# Patient Record
Sex: MALE | Race: OTHER | HISPANIC OR LATINO | URBAN - METROPOLITAN AREA
[De-identification: names, ages, dates, MRNs, and addresses within clinical notes are randomized per-mention and may not be internally consistent; named-entity substitution may affect disease eponyms.]

---

## 2018-08-28 ENCOUNTER — INPATIENT (INPATIENT)
Facility: HOSPITAL | Age: 28
LOS: 3 days | Discharge: HOME | End: 2018-09-01
Attending: INTERNAL MEDICINE | Admitting: INTERNAL MEDICINE

## 2018-08-28 VITALS
OXYGEN SATURATION: 95 % | TEMPERATURE: 100 F | HEIGHT: 65 IN | HEART RATE: 130 BPM | DIASTOLIC BLOOD PRESSURE: 101 MMHG | RESPIRATION RATE: 19 BRPM | WEIGHT: 149.91 LBS | SYSTOLIC BLOOD PRESSURE: 157 MMHG

## 2018-08-28 LAB
ALBUMIN SERPL ELPH-MCNC: 5.1 G/DL — SIGNIFICANT CHANGE UP (ref 3.5–5.2)
ALP SERPL-CCNC: 96 U/L — SIGNIFICANT CHANGE UP (ref 30–115)
ALT FLD-CCNC: 37 U/L — SIGNIFICANT CHANGE UP (ref 0–41)
ANION GAP SERPL CALC-SCNC: 19 MMOL/L — HIGH (ref 7–14)
ANION GAP SERPL CALC-SCNC: 24 MMOL/L — HIGH (ref 7–14)
APAP SERPL-MCNC: <5 UG/ML — LOW (ref 10–30)
APPEARANCE UR: CLEAR — SIGNIFICANT CHANGE UP
AST SERPL-CCNC: 63 U/L — HIGH (ref 0–41)
BASE EXCESS BLDV CALC-SCNC: 0.6 MMOL/L — SIGNIFICANT CHANGE UP (ref -2–2)
BASOPHILS # BLD AUTO: 0.04 K/UL — SIGNIFICANT CHANGE UP (ref 0–0.2)
BASOPHILS NFR BLD AUTO: 0.2 % — SIGNIFICANT CHANGE UP (ref 0–1)
BILIRUB SERPL-MCNC: 1.3 MG/DL — HIGH (ref 0.2–1.2)
BILIRUB UR-MCNC: NEGATIVE — SIGNIFICANT CHANGE UP
BUN SERPL-MCNC: 13 MG/DL — SIGNIFICANT CHANGE UP (ref 10–20)
BUN SERPL-MCNC: 9 MG/DL — LOW (ref 10–20)
CALCIUM SERPL-MCNC: 7.5 MG/DL — LOW (ref 8.5–10.1)
CALCIUM SERPL-MCNC: 9.2 MG/DL — SIGNIFICANT CHANGE UP (ref 8.5–10.1)
CHLORIDE SERPL-SCNC: 95 MMOL/L — LOW (ref 98–110)
CHLORIDE SERPL-SCNC: 98 MMOL/L — SIGNIFICANT CHANGE UP (ref 98–110)
CO2 SERPL-SCNC: 20 MMOL/L — SIGNIFICANT CHANGE UP (ref 17–32)
CO2 SERPL-SCNC: 21 MMOL/L — SIGNIFICANT CHANGE UP (ref 17–32)
COLOR SPEC: YELLOW — SIGNIFICANT CHANGE UP
CREAT SERPL-MCNC: 0.7 MG/DL — SIGNIFICANT CHANGE UP (ref 0.7–1.5)
CREAT SERPL-MCNC: 1 MG/DL — SIGNIFICANT CHANGE UP (ref 0.7–1.5)
DIFF PNL FLD: NEGATIVE — SIGNIFICANT CHANGE UP
EOSINOPHIL # BLD AUTO: 0 K/UL — SIGNIFICANT CHANGE UP (ref 0–0.7)
EOSINOPHIL NFR BLD AUTO: 0 % — SIGNIFICANT CHANGE UP (ref 0–8)
ETHANOL SERPL-MCNC: 329 MG/DL — HIGH
GLUCOSE SERPL-MCNC: 134 MG/DL — HIGH (ref 70–99)
GLUCOSE SERPL-MCNC: 168 MG/DL — HIGH (ref 70–99)
GLUCOSE UR QL: NEGATIVE MG/DL — SIGNIFICANT CHANGE UP
HCO3 BLDV-SCNC: 23 MMOL/L — SIGNIFICANT CHANGE UP (ref 22–29)
HCT VFR BLD CALC: 41.7 % — LOW (ref 42–52)
HCT VFR BLD CALC: 49 % — SIGNIFICANT CHANGE UP (ref 42–52)
HGB BLD-MCNC: 15.2 G/DL — SIGNIFICANT CHANGE UP (ref 14–18)
HGB BLD-MCNC: 17.9 G/DL — SIGNIFICANT CHANGE UP (ref 14–18)
HOROWITZ INDEX BLDV+IHG-RTO: 21 — SIGNIFICANT CHANGE UP
IMM GRANULOCYTES NFR BLD AUTO: 0.4 % — HIGH (ref 0.1–0.3)
KETONES UR-MCNC: NEGATIVE — SIGNIFICANT CHANGE UP
LACTATE BLDV-MCNC: 5.6 MMOL/L — HIGH (ref 0.5–1.6)
LACTATE SERPL-SCNC: 3.9 MMOL/L — HIGH (ref 0.5–2.2)
LEUKOCYTE ESTERASE UR-ACNC: NEGATIVE — SIGNIFICANT CHANGE UP
LIDOCAIN IGE QN: 22 U/L — SIGNIFICANT CHANGE UP (ref 7–60)
LYMPHOCYTES # BLD AUTO: 0.85 K/UL — LOW (ref 1.2–3.4)
LYMPHOCYTES # BLD AUTO: 4.1 % — LOW (ref 20.5–51.1)
MCHC RBC-ENTMCNC: 31.8 PG — HIGH (ref 27–31)
MCHC RBC-ENTMCNC: 32.3 PG — HIGH (ref 27–31)
MCHC RBC-ENTMCNC: 36.5 G/DL — SIGNIFICANT CHANGE UP (ref 32–37)
MCHC RBC-ENTMCNC: 36.5 G/DL — SIGNIFICANT CHANGE UP (ref 32–37)
MCV RBC AUTO: 87 FL — SIGNIFICANT CHANGE UP (ref 80–94)
MCV RBC AUTO: 88.5 FL — SIGNIFICANT CHANGE UP (ref 80–94)
MONOCYTES # BLD AUTO: 1.73 K/UL — HIGH (ref 0.1–0.6)
MONOCYTES NFR BLD AUTO: 8.3 % — SIGNIFICANT CHANGE UP (ref 1.7–9.3)
NEUTROPHILS # BLD AUTO: 18.08 K/UL — HIGH (ref 1.4–6.5)
NEUTROPHILS NFR BLD AUTO: 87 % — HIGH (ref 42.2–75.2)
NITRITE UR-MCNC: NEGATIVE — SIGNIFICANT CHANGE UP
NRBC # BLD: 0 /100 WBCS — SIGNIFICANT CHANGE UP (ref 0–0)
NRBC # BLD: 0 /100 WBCS — SIGNIFICANT CHANGE UP (ref 0–0)
PCO2 BLDV: 32 MMHG — LOW (ref 41–51)
PH BLDV: 7.47 — HIGH (ref 7.26–7.43)
PH UR: 6.5 — SIGNIFICANT CHANGE UP (ref 5–8)
PLATELET # BLD AUTO: 328 K/UL — SIGNIFICANT CHANGE UP (ref 130–400)
PLATELET # BLD AUTO: 426 K/UL — HIGH (ref 130–400)
PO2 BLDV: 82 MMHG — HIGH (ref 20–40)
POTASSIUM SERPL-MCNC: 3.5 MMOL/L — SIGNIFICANT CHANGE UP (ref 3.5–5)
POTASSIUM SERPL-MCNC: 4 MMOL/L — SIGNIFICANT CHANGE UP (ref 3.5–5)
POTASSIUM SERPL-SCNC: 3.5 MMOL/L — SIGNIFICANT CHANGE UP (ref 3.5–5)
POTASSIUM SERPL-SCNC: 4 MMOL/L — SIGNIFICANT CHANGE UP (ref 3.5–5)
PROT SERPL-MCNC: 7.5 G/DL — SIGNIFICANT CHANGE UP (ref 6–8)
PROT UR-MCNC: NEGATIVE MG/DL — SIGNIFICANT CHANGE UP
RBC # BLD: 4.71 M/UL — SIGNIFICANT CHANGE UP (ref 4.7–6.1)
RBC # BLD: 5.63 M/UL — SIGNIFICANT CHANGE UP (ref 4.7–6.1)
RBC # FLD: 11.7 % — SIGNIFICANT CHANGE UP (ref 11.5–14.5)
RBC # FLD: 11.9 % — SIGNIFICANT CHANGE UP (ref 11.5–14.5)
SALICYLATES SERPL-MCNC: <0.3 MG/DL — LOW (ref 4–30)
SAO2 % BLDV: 95 % — SIGNIFICANT CHANGE UP
SODIUM SERPL-SCNC: 137 MMOL/L — SIGNIFICANT CHANGE UP (ref 135–146)
SODIUM SERPL-SCNC: 140 MMOL/L — SIGNIFICANT CHANGE UP (ref 135–146)
SP GR SPEC: 1.01 — SIGNIFICANT CHANGE UP (ref 1.01–1.03)
UROBILINOGEN FLD QL: 0.2 MG/DL — SIGNIFICANT CHANGE UP (ref 0.2–0.2)
WBC # BLD: 20.79 K/UL — HIGH (ref 4.8–10.8)
WBC # BLD: 21.09 K/UL — HIGH (ref 4.8–10.8)
WBC # FLD AUTO: 20.79 K/UL — HIGH (ref 4.8–10.8)
WBC # FLD AUTO: 21.09 K/UL — HIGH (ref 4.8–10.8)

## 2018-08-28 RX ORDER — ONDANSETRON 8 MG/1
4 TABLET, FILM COATED ORAL ONCE
Qty: 0 | Refills: 0 | Status: COMPLETED | OUTPATIENT
Start: 2018-08-28 | End: 2018-08-28

## 2018-08-28 RX ORDER — SODIUM CHLORIDE 9 MG/ML
1000 INJECTION INTRAMUSCULAR; INTRAVENOUS; SUBCUTANEOUS ONCE
Qty: 0 | Refills: 0 | Status: COMPLETED | OUTPATIENT
Start: 2018-08-28 | End: 2018-08-28

## 2018-08-28 RX ADMIN — ONDANSETRON 4 MILLIGRAM(S): 8 TABLET, FILM COATED ORAL at 19:41

## 2018-08-28 RX ADMIN — SODIUM CHLORIDE 1000 MILLILITER(S): 9 INJECTION INTRAMUSCULAR; INTRAVENOUS; SUBCUTANEOUS at 23:16

## 2018-08-28 RX ADMIN — SODIUM CHLORIDE 2000 MILLILITER(S): 9 INJECTION INTRAMUSCULAR; INTRAVENOUS; SUBCUTANEOUS at 17:31

## 2018-08-28 RX ADMIN — SODIUM CHLORIDE 2000 MILLILITER(S): 9 INJECTION INTRAMUSCULAR; INTRAVENOUS; SUBCUTANEOUS at 14:37

## 2018-08-28 RX ADMIN — ONDANSETRON 4 MILLIGRAM(S): 8 TABLET, FILM COATED ORAL at 16:50

## 2018-08-28 RX ADMIN — SODIUM CHLORIDE 2000 MILLILITER(S): 9 INJECTION INTRAMUSCULAR; INTRAVENOUS; SUBCUTANEOUS at 17:32

## 2018-08-28 RX ADMIN — Medication 0.5 MILLIGRAM(S): at 15:42

## 2018-08-28 RX ADMIN — SODIUM CHLORIDE 2000 MILLILITER(S): 9 INJECTION INTRAMUSCULAR; INTRAVENOUS; SUBCUTANEOUS at 23:16

## 2018-08-28 NOTE — H&P ADULT - HISTORY OF PRESENT ILLNESS
[age]yo male presented to the ER intoxicated requesting alcohol detox however during evaluation several abnormalities were found. Patient does have nausea but denied fevers, chest pain or shortness of breath 28yo male presented to the ER intoxicated requesting alcohol detox (per ER documents) however during evaluation several abnormalities were found. Patient does have nausea but denied fevers, shortness of breath, chest or abdominal pains

## 2018-08-28 NOTE — ED PROVIDER NOTE - PHYSICAL EXAMINATION
Vital Signs: I have reviewed the initial vital signs.  Constitutional: (+) AOB, NAD,   Eyes: PERRLA, EOMI, no nystagmus, clear conjunctiva  Cardiovascular: regular rate, regular rhythm, no murmur appreciated  Respiratory: unlabored respiratory effort, clear to auscultation bilaterally  Gastrointestinal: soft, non-tender, non-distended  abdomen, no pulsatile mass  Musculoskeletal: supple neck, no lower extremity edema, no bony tenderness  Integumentary: warm, dry, no rash  Neurologic: awake, alert, cranial nerves II-XII grossly intact, extremities’ motor and sensory functions grossly intact, no focal deficits, GCS 15  Psychiatric: appropriate mood, appropriate affect Vital Signs: I have reviewed the initial vital signs.  Constitutional: (+) AOB, NAD,   Eyes: PERRLA, EOMI, clear conjunctiva  Cardiovascular: tachycardic rate, regular rhythm, no murmur appreciated  Respiratory: unlabored respiratory effort, clear to auscultation bilaterally  Gastrointestinal: soft, non-tender, non-distended  abdomen, no pulsatile mass  Musculoskeletal: supple neck, no lower extremity edema, no bony tenderness  Integumentary: warm, dry, no rash  Neurologic: awake, alert, cranial nerves II-XII grossly intact, extremities’ motor and sensory functions grossly intact, no focal deficits, GCS 15  Psychiatric: appropriate mood, appropriate affect

## 2018-08-28 NOTE — H&P ADULT - NSHPLABSRESULTS_GEN_ALL_CORE
15.2    )-----------( 328      ( 28 Aug 2018 20:45 )             41.7         137  |  98  |  9<L>  ----------------------------<  134<H>  4.0   |  20  |  0.7    Ca    7.5<L>      28 Aug 2018 20:45    TPro  7.5  /  Alb  5.1  /  TBili  1.3<H>  /  DBili  x   /  AST  63<H>  /  ALT  37  /  AlkPhos  96            Urinalysis Basic - ( 28 Aug 2018 22:54 )    Color: Yellow / Appearance: Clear / S.015 / pH: x  Gluc: x / Ketone: Negative  / Bili: Negative / Urobili: 0.2 mg/dL   Blood: x / Protein: Negative mg/dL / Nitrite: Negative   Leuk Esterase: Negative / RBC: x / WBC x   Sq Epi: x / Non Sq Epi: x / Bacteria: x        Lactate Trend   @ 20:45 Lactate:3.9         CAPILLARY BLOOD GLUCOSE    < from: CT Abdomen and Pelvis w/ IV Cont (18 @ 23:09) >      EXAM:  CT ABDOMEN AND PELVIS IC            PROCEDURE DATE:  2018          < end of copied text >    < from: CT Abdomen and Pelvis w/ IV Cont (18 @ 23:09) >      IMPRESSION:      No CT evidence of acute intra-abdominal pathology.                  SHELBIE NICOLE M.D., RESIDENT RADIOLOGIST  This document has been electronically signed.  MACK RANGEL M.D., ATTENDING RADIOLOGIST  This document has been electronically signed. Aug 28 2018 11:37PM                < end of copied text > 15.2    )-----------( 328      ( 28 Aug 2018 20:45 )             41.7         137  |  98  |  9<L>  ----------------------------<  134<H>  4.0   |  20  |  0.7    Ca    7.5<L>      28 Aug 2018 20:45    TPro  7.5  /  Alb  5.1  /  TBili  1.3<H>  /  DBili  x   /  AST  63<H>  /  ALT  37  /  AlkPhos  96            Urinalysis Basic - ( 28 Aug 2018 22:54 )    Color: Yellow / Appearance: Clear / S.015 / pH: x  Gluc: x / Ketone: Negative  / Bili: Negative / Urobili: 0.2 mg/dL   Blood: x / Protein: Negative mg/dL / Nitrite: Negative   Leuk Esterase: Negative / RBC: x / WBC x   Sq Epi: x / Non Sq Epi: x / Bacteria: x        Lactate Trend   @ 20:45 Lactate:3.9     ETOH-329      < from: CT Abdomen and Pelvis w/ IV Cont (18 @ 23:09) >    EXAM:  CT ABDOMEN AND PELVIS IC        PROCEDURE DATE:  2018      IMPRESSION:    No CT evidence of acute intra-abdominal pathology.    SHELBIE NICOLE M.D., RESIDENT RADIOLOGIST  This document has been electronically signed.  MACK RANGEL M.D., ATTENDING RADIOLOGIST  This document has been electronically signed. Aug 28 2018 11:37PM     < end of copied text >

## 2018-08-28 NOTE — ED PROVIDER NOTE - PROGRESS NOTE DETAILS
patient able to ambulate without difficulty in the ED. patient speaks Icelandic. RN catherine marie to provide translation at [facility] patient with multiple episodes of diarrhea in the ED . will give IV fluids and repeat labs Pt received IVFs and labs repeated,  Feeling better repeat labs lactate and anion gap appreciated. patient feeling better. awaiting UA and will preform CT of abdomen for continued WBC elevated

## 2018-08-28 NOTE — H&P ADULT - PROBLEM SELECTOR PLAN 1
for now librium, thiamine, folic acid and MVI for now ativan withdrawal protocol, thiamine, folic acid and MVI

## 2018-08-28 NOTE — ED ADULT TRIAGE NOTE - CHIEF COMPLAINT QUOTE
BIBA from home as per EMS "A neighbor called because patient is very intoxicated, he said he drank a lot of beer today".  Pt denies wanting to harmself, pt request detox from alcohol.  used at bedside

## 2018-08-28 NOTE — ED PROVIDER NOTE - CARE PLAN
Principal Discharge DX:	Leukocytosis  Secondary Diagnosis:	Elevated lactic acid level  Secondary Diagnosis:	Alcohol intoxication

## 2018-08-28 NOTE — ED PROVIDER NOTE - ATTENDING CONTRIBUTION TO CARE
26 yo M presents via EMS found intoxicated,  Pt c/o nausea and vomiting.  Had diarrhea in ED, no pain, no fevers.  Admits to drinking beer,  On exam pt in NAD AAo x 3, OP clear, Lungs cAT B/L no wrr, abd isosft nt nd, steady gait

## 2018-08-28 NOTE — ED PROVIDER NOTE - OBJECTIVE STATEMENT
26 y/o M BIBA after being found intoxicated. Pt states he was drinking beer but denies any drug use. Pt denies any SOB, CP, abd pain, fever, chills, v/n/d, SI, HI, and AVH. 28 y/o M BIBA after being found intoxicated. Pt states he was drinking beer but denies any drug use. Pt denies any SOB, CP, abd pain, fever, chills, v/n/d, SI, HI, and AVH. patient requesting alcohol detox

## 2018-08-29 DIAGNOSIS — D72.829 ELEVATED WHITE BLOOD CELL COUNT, UNSPECIFIED: ICD-10-CM

## 2018-08-29 DIAGNOSIS — R79.89 OTHER SPECIFIED ABNORMAL FINDINGS OF BLOOD CHEMISTRY: ICD-10-CM

## 2018-08-29 DIAGNOSIS — F10.929 ALCOHOL USE, UNSPECIFIED WITH INTOXICATION, UNSPECIFIED: ICD-10-CM

## 2018-08-29 LAB
ALBUMIN SERPL ELPH-MCNC: 4.1 G/DL — SIGNIFICANT CHANGE UP (ref 3.5–5.2)
ALP SERPL-CCNC: 83 U/L — SIGNIFICANT CHANGE UP (ref 30–115)
ALT FLD-CCNC: 33 U/L — SIGNIFICANT CHANGE UP (ref 0–41)
ANION GAP SERPL CALC-SCNC: 17 MMOL/L — HIGH (ref 7–14)
AST SERPL-CCNC: 48 U/L — HIGH (ref 0–41)
BILIRUB SERPL-MCNC: 1.8 MG/DL — HIGH (ref 0.2–1.2)
BUN SERPL-MCNC: 7 MG/DL — LOW (ref 10–20)
CALCIUM SERPL-MCNC: 8 MG/DL — LOW (ref 8.5–10.1)
CHLORIDE SERPL-SCNC: 95 MMOL/L — LOW (ref 98–110)
CO2 SERPL-SCNC: 24 MMOL/L — SIGNIFICANT CHANGE UP (ref 17–32)
CREAT SERPL-MCNC: 0.7 MG/DL — SIGNIFICANT CHANGE UP (ref 0.7–1.5)
GLUCOSE SERPL-MCNC: 107 MG/DL — HIGH (ref 70–99)
HCT VFR BLD CALC: 40.7 % — LOW (ref 42–52)
HGB BLD-MCNC: 14.6 G/DL — SIGNIFICANT CHANGE UP (ref 14–18)
LACTATE SERPL-SCNC: 2.9 MMOL/L — HIGH (ref 0.5–2.2)
MCHC RBC-ENTMCNC: 32.1 PG — HIGH (ref 27–31)
MCHC RBC-ENTMCNC: 35.9 G/DL — SIGNIFICANT CHANGE UP (ref 32–37)
MCV RBC AUTO: 89.5 FL — SIGNIFICANT CHANGE UP (ref 80–94)
NRBC # BLD: 0 /100 WBCS — SIGNIFICANT CHANGE UP (ref 0–0)
PLATELET # BLD AUTO: 305 K/UL — SIGNIFICANT CHANGE UP (ref 130–400)
POTASSIUM SERPL-MCNC: 3.5 MMOL/L — SIGNIFICANT CHANGE UP (ref 3.5–5)
POTASSIUM SERPL-SCNC: 3.5 MMOL/L — SIGNIFICANT CHANGE UP (ref 3.5–5)
PROT SERPL-MCNC: 6.2 G/DL — SIGNIFICANT CHANGE UP (ref 6–8)
RBC # BLD: 4.55 M/UL — LOW (ref 4.7–6.1)
RBC # FLD: 11.9 % — SIGNIFICANT CHANGE UP (ref 11.5–14.5)
SODIUM SERPL-SCNC: 136 MMOL/L — SIGNIFICANT CHANGE UP (ref 135–146)
WBC # BLD: 11.61 K/UL — HIGH (ref 4.8–10.8)
WBC # FLD AUTO: 11.61 K/UL — HIGH (ref 4.8–10.8)

## 2018-08-29 RX ORDER — FOLIC ACID 0.8 MG
1 TABLET ORAL DAILY
Qty: 0 | Refills: 0 | Status: DISCONTINUED | OUTPATIENT
Start: 2018-08-28 | End: 2018-09-01

## 2018-08-29 RX ORDER — PANTOPRAZOLE SODIUM 20 MG/1
40 TABLET, DELAYED RELEASE ORAL
Qty: 0 | Refills: 0 | Status: DISCONTINUED | OUTPATIENT
Start: 2018-08-29 | End: 2018-09-01

## 2018-08-29 RX ORDER — THIAMINE MONONITRATE (VIT B1) 100 MG
100 TABLET ORAL DAILY
Qty: 0 | Refills: 0 | Status: DISCONTINUED | OUTPATIENT
Start: 2018-08-28 | End: 2018-09-01

## 2018-08-29 RX ORDER — SODIUM CHLORIDE 9 MG/ML
1000 INJECTION INTRAMUSCULAR; INTRAVENOUS; SUBCUTANEOUS
Qty: 0 | Refills: 0 | Status: DISCONTINUED | OUTPATIENT
Start: 2018-08-28 | End: 2018-08-31

## 2018-08-29 RX ORDER — ONDANSETRON 8 MG/1
4 TABLET, FILM COATED ORAL EVERY 6 HOURS
Qty: 0 | Refills: 0 | Status: DISCONTINUED | OUTPATIENT
Start: 2018-08-29 | End: 2018-09-01

## 2018-08-29 RX ADMIN — Medication 1 MILLIGRAM(S): at 12:14

## 2018-08-29 RX ADMIN — Medication 100 MILLIGRAM(S): at 12:14

## 2018-08-29 RX ADMIN — Medication 1.5 MILLIGRAM(S): at 15:06

## 2018-08-29 RX ADMIN — ONDANSETRON 4 MILLIGRAM(S): 8 TABLET, FILM COATED ORAL at 01:33

## 2018-08-29 RX ADMIN — Medication 1.5 MILLIGRAM(S): at 21:35

## 2018-08-29 RX ADMIN — Medication 1 TABLET(S): at 12:14

## 2018-08-29 RX ADMIN — Medication 1.5 MILLIGRAM(S): at 05:45

## 2018-08-29 RX ADMIN — SODIUM CHLORIDE 1000 MILLILITER(S): 9 INJECTION INTRAMUSCULAR; INTRAVENOUS; SUBCUTANEOUS at 00:05

## 2018-08-29 RX ADMIN — Medication 1.5 MILLIGRAM(S): at 17:14

## 2018-08-29 RX ADMIN — Medication 1.5 MILLIGRAM(S): at 01:33

## 2018-08-29 RX ADMIN — Medication 1.5 MILLIGRAM(S): at 10:30

## 2018-08-29 NOTE — CONSULT NOTE ADULT - ASSESSMENT
alcohol dependence intoxication    medical stabilization  librium protocol   transfer to detox upon medical stabilization. alcohol dependence intoxication    [facility]   [facility] upon [facility].

## 2018-08-29 NOTE — CONSULT NOTE ADULT - SUBJECTIVE AND OBJECTIVE BOX
Reason for Admission: alcohol induced abnormalities	  History of Present Illness: 	  28yo male presented to the ER intoxicated requesting alcohol detox (per ER documents) however during evaluation several abnormalities were found. Patient does have nausea but denied fevers, shortness of breath, chest or abdominal pains    pt reports drinking 12 pack of mexican beer every day and came to hospital to get help to stop drinking. wants detox program. denies any other drug use. no psychiatric problems.    alert ox 3 mood stable no psychosis no threat to self or others. i/j limited.

## 2018-08-29 NOTE — PROGRESS NOTE ADULT - ASSESSMENT
26yo male presented to the ER intoxicated requesting alcohol detox (per ER documents) however during evaluation several abnormalities were found. Patient does have nausea but denied fevers, shortness of breath, chest or abdominal pains    pt reports drinking 12 pack of mexican beer every day and came to hospital to get help to stop drinking. wants detox program. denies any other drug use. no psychiatric problems.    alert ox 3 mood stable no psychosis no threat to self or others. i/j limited.    alcohol dependence intoxication    medical stabilization  ativan  protocol   transfer to detox upon medical stabilization. Patient is a 28yo male with h/o Alcohol dependence, presented to the ER intoxicated requesting alcohol detox (per ER documents) however during evaluation he was noted to have elevated lactate level & leukocytosis of 20k. Patient had no other complaints except persistent nausea without vomiting and epigastric pain. He mentions drinking ~ 12 cans of Mexican beer every day with last drink being the morning of presentation. He denies any other drug use and has no Psychiatric problems.      Assessment and Plan:   Problem/Plan - 1:  ·  Problem: Alcoholic intoxication with complication.    Plan: Continue Ativan withdrawal protocol,. On thiamine, folic acid and MVI.   Transfer to detox upon medical stabilization.      Problem/Plan - 2:  ·  Problem: Leukocytosis, unspecified type.    Plan: No obvious source of Infection. Levels trending down.  Continue monitoring. .       Problem/Plan - 3:  ·  Problem: Elevated lactic acid level.    Plan: Continue IV fluids. Levels trending down.       DVT prophylaxis: Heparin.  Disposition: Detox when stable.

## 2018-08-29 NOTE — PROGRESS NOTE ADULT - SUBJECTIVE AND OBJECTIVE BOX
MARIA INES CHILDERSO  27y  Male    Patient is a 27y old  Male who presents with a chief complaint of alcohol induced abnormalities (28 Aug 2018 23:42)      INTERVAL HPI/OVERNIGHT EVENTS:      REVIEW OF SYSTEMS:  CONSTITUTIONAL: No fever, weight loss, or fatigue  EYES: No eye pain, visual disturbances, or discharge  ENMT:  No difficulty hearing, tinnitus, vertigo; No sinus or throat pain  NECK: No pain or stiffness  BREASTS: No pain, masses, or nipple discharge  RESPIRATORY: No cough, wheezing, chills or hemoptysis; No shortness of breath  CARDIOVASCULAR: No chest pain, palpitations, dizziness, or leg swelling  GASTROINTESTINAL: No abdominal or epigastric pain. No nausea, vomiting, or hematemesis; No diarrhea or constipation. No melena or hematochezia.  GENITOURINARY: No dysuria, frequency, hematuria, or incontinence  NEUROLOGICAL: No headaches, memory loss, loss of strength, numbness, or tremors  SKIN: No itching, burning, rashes, or lesions   LYMPH NODES: No enlarged glands  ENDOCRINE: No heat or cold intolerance; No hair loss  MUSCULOSKELETAL: No joint pain or swelling; No muscle, back, or extremity pain  PSYCHIATRIC: No depression, anxiety, mood swings, or difficulty sleeping  HEME/LYMPH: No easy bruising, or bleeding gums  ALLERY AND IMMUNOLOGIC: No hives or eczema    VITALS:  T(F): 97.4 (08-29-18 @ 00:04), Max: 100.1 (08-28-18 @ 13:52)  HR: 73 (08-29-18 @ 00:04) (73 - 130)  BP: 136/91 (08-29-18 @ 00:04) (136/73 - 157/101)  RR: 18 (08-29-18 @ 00:04) (18 - 19)  SpO2: 98% (08-29-18 @ 00:04) (95% - 99%)    PHYSICAL EXAM:  GENERAL: NAD, well-groomed, well-developed  HEAD:  Atraumatic, Normocephalic  EYES: EOMI, PERRLA, conjunctiva and sclera clear  ENMT: No tonsillar erythema, exudates, or enlargement; Moist mucous membranes, Good dentition, No lesions  NECK: Supple, No JVD, Normal thyroid  NERVOUS SYSTEM:  Alert & Oriented X3, Good concentration; Motor Strength 5/5 B/L upper and lower extremities; DTRs 2+ intact and symmetric  CHEST/LUNG: Clear to percussion bilaterally; No rales, rhonchi, wheezing, or rubs  HEART: Regular rate and rhythm; No murmurs, rubs, or gallops  ABDOMEN: Soft, Nontender, Nondistended; Bowel sounds present  EXTREMITIES:  2+ Peripheral Pulses, No clubbing, cyanosis, or edema  LYMPH: No lymphadenopathy noted  SKIN: No rashes or lesions    Consultant(s) Notes Reviewed:  [x ] YES  [ ] NO  Care Discussed with Consultants/Other Providers [ x] YES  [ ] NO    LABS:                        14.6   11.61 )-----------( 305      ( 29 Aug 2018 06:29 )             40.7     08-29    136  |  95<L>  |  7<L>  ----------------------------<  107<H>  3.5   |  24  |  0.7    Ca    8.0<L>      29 Aug 2018 06:29    TPro  6.2  /  Alb  4.1  /  TBili  1.8<H>  /  DBili  x   /  AST  48<H>  /  ALT  33  /  AlkPhos  83  08-29    MICROBIOLOGY:      RADIOLOGY & ADDITIONAL TESTS:    Imaging Personally Reviewed:  [ ] YES  [ ] NO  MEDICATION:  folic acid 1 milliGRAM(s) Oral daily  LORazepam     Tablet   Oral   LORazepam     Tablet 1.5 milliGRAM(s) Oral every 4 hours  multivitamin 1 Tablet(s) Oral daily  ondansetron Injectable 4 milliGRAM(s) IV Push every 6 hours PRN  sodium chloride 0.9%. 1000 milliLiter(s) IV Continuous <Continuous>  thiamine 100 milliGRAM(s) Oral daily      HEALTH ISSUES:  HEALTH ISSUES - PROBLEM Dx:  Elevated lactic acid level: Elevated lactic acid level  Leukocytosis, unspecified type: Leukocytosis, unspecified type  Alcoholic intoxication with complication: Alcoholic intoxication with complication MARIA INES CHILDERSO  27y  Male    Patient is a 27y old  Male who presents with a chief complaint of alcohol Intoxication (28 Aug 2018 23:42)      INTERVAL HPI/OVERNIGHT EVENTS:  No interval events. Patient complaining of Insomnia, epigastric pain and nausea.      REVIEW OF SYSTEMS:  CONSTITUTIONAL: No fever, weight loss, or fatigue  EYES: No eye pain, visual disturbances, or discharge  ENMT:  No difficulty hearing, tinnitus, vertigo; No sinus or throat pain  NECK: No pain or stiffness  BREASTS: No pain, masses, or nipple discharge  RESPIRATORY: No cough, wheezing, chills or hemoptysis; No shortness of breath  CARDIOVASCULAR: No chest pain, palpitations, dizziness, or leg swelling  GASTROINTESTINAL: + epigastric pain. + nausea, No vomiting, or hematemesis; No diarrhea or constipation. No melena or hematochezia.  GENITOURINARY: No dysuria, frequency, hematuria, or incontinence  NEUROLOGICAL: No headaches, memory loss, loss of strength, numbness, or tremors  SKIN: No itching, burning, rashes, or lesions   LYMPH NODES: No enlarged glands  ENDOCRINE: No heat or cold intolerance; No hair loss  MUSCULOSKELETAL: No joint pain or swelling; No muscle, back, or extremity pain  PSYCHIATRIC: No depression, anxiety, mood swings, + difficulty sleeping  HEME/LYMPH: No easy bruising, or bleeding gums  ALLERGY AND IMMUNOLOGIC: No hives or eczema      VITALS:  T(F): 97.4 (18 @ 00:04), Max: 100.1 (18 @ 13:52)  HR: 73 (18 @ 00:04) (73 - 130)  BP: 136/91 (18 @ 00:04) (136/73 - 157/101)  RR: 18 (18 @ 00:04) (18 - 19)  SpO2: 98% (18 @ 00:04) (95% - 99%)      PHYSICAL EXAM:  GENERAL: NAD  HEAD:  Atraumatic, Normocephalic  EYES: conjunctiva and sclera clear  ENMT: Moist mucous membranes, Good dentition, No lesions  NECK: Supple, Normal thyroid  NERVOUS SYSTEM:  Alert & Oriented X 3, Good concentration; Motor Strength 5/5 B/L upper and lower extremities  CHEST/LUNG: Clear to auscultation bilaterally; No rales, rhonchi, wheezing, or rubs  HEART: Regular rate and rhythm; No murmurs, rubs, or gallops  ABDOMEN: Soft, Nontender, Nondistended; Bowel sounds present  EXTREMITIES:  2+ Peripheral Pulses, No clubbing, cyanosis, or edema  LYMPH: No lymphadenopathy noted  SKIN: No rashes or lesions    Consultant(s) Notes Reviewed:  [x ] YES  [ ] NO  Care Discussed with Consultants/Other Providers [ x] YES  [ ] NO    LABS:                        14.6   11.61 )-----------( 305      ( 29 Aug 2018 06:29 )             40.7         136  |  95<L>  |  7<L>  ----------------------------<  107<H>  3.5   |  24  |  0.7    Ca    8.0<L>      29 Aug 2018 06:29    TPro  6.2  /  Alb  4.1  /  TBili  1.8<H>  /  DBili  x   /  AST  48<H>  /  ALT  33  /  AlkPhos  83      Acetaminophen Level, Serum: <5.0 ug/mL (18 @ 14:20)  Salicylate Level, Serum: <0.3 mg/dL (18 @ 14:20)  Alcohol, Blood: 329 mg/dL (18 @ 14:20)      Blood Gas Venous - Lactate: 5.6 mmoL/L (18 @ 14:20)  Lactate, Blood: 3.9 mmol/L (18 @ 20:45)  Lactate, Blood: 2.9 mmol/L (18 @ 06:56)      Urinalysis Basic - ( 28 Aug 2018 22:54 )    Color: Yellow / Appearance: Clear / S.015 / pH: x  Gluc: x / Ketone: Negative  / Bili: Negative / Urobili: 0.2 mg/dL   Blood: x / Protein: Negative mg/dL / Nitrite: Negative   Leuk Esterase: Negative / RBC: x / WBC x   Sq Epi: x / Non Sq Epi: x / Bacteria: x      MICROBIOLOGY: None      RADIOLOGY & ADDITIONAL TESTS:  X-ray Chest 2 Views PA/Lat (18 @ 17:39)   Support devices: None.    Cardiac/mediastinum/hilum: Unremarkable.    Lung parenchyma/Pleura: Within normal limits. Hyperinflation.    Skeleton/soft tissues: Levoscoliosis.    Impression:      No radiographic evidence of acute cardiopulmonary disease.      CT Abdomen and Pelvis w/ IV Cont (18 @ 23:09)   LOWER CHEST: Unremarkable.    HEPATOBILIARY: The liver is normal in appearance.  No evidence of intra   or extrahepatic biliary dilatation. The gallbladder is suboptimally   imaged.    SPLEEN: Unremarkable.    PANCREAS: Unremarkable.    ADRENAL GLANDS: Unremarkable.    KIDNEYS: Symmetric pattern of renal enhancement. No evidence of a mass,   hydronephrosis or hydroureter.    ABDOMINOPELVIC NODES: No enlarged abdominal or pelvic lymph nodes.    PELVIC ORGANS: Unremarkable.    PERITONEUM/MESENTERY/BOWEL: No bowel obstruction, ascites or free   intraperitoneal air. The appendix is unremarkable.    BONES/SOFT TISSUES: There is a well-corticated defect involving the   posterior aspect of the superior L4 endplate with small ossicle noted   slightly more posteriorly. 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 Superimposed disc herniation is noted.    VASCULAR:  The aorta is normal in caliber.      IMPRESSION:      No CT evidence of acute intra-abdominal pathology.      Imaging Personally Reviewed:  [x] YES  [ ] NO    MEDICATIONS  (STANDING):  folic acid 1 milliGRAM(s) Oral daily  LORazepam     Tablet   Oral   LORazepam     Tablet 1.5 milliGRAM(s) Oral every 4 hours  multivitamin 1 Tablet(s) Oral daily  sodium chloride 0.9%. 1000 milliLiter(s) (100 mL/Hr) IV Continuous <Continuous>  thiamine 100 milliGRAM(s) Oral daily    MEDICATIONS  (PRN):  ondansetron Injectable 4 milliGRAM(s) IV Push every 6 hours PRN Nausea and/or Vomiting        HEALTH ISSUES - PROBLEM Dx:  Elevated lactic acid level  Leukocytosis, unspecified type  Alcoholic intoxication with complication

## 2018-08-29 NOTE — PROGRESS NOTE ADULT - SUBJECTIVE AND OBJECTIVE BOX
Patient tells me he is feeling better and wants to have a regular diet (He also mentions that he is not interested in staying for a detox admission)

## 2018-08-30 LAB
ALBUMIN SERPL ELPH-MCNC: 4.1 G/DL — SIGNIFICANT CHANGE UP (ref 3.5–5.2)
ALP SERPL-CCNC: 117 U/L — HIGH (ref 30–115)
ALT FLD-CCNC: 35 U/L — SIGNIFICANT CHANGE UP (ref 0–41)
ANION GAP SERPL CALC-SCNC: 15 MMOL/L — HIGH (ref 7–14)
AST SERPL-CCNC: 42 U/L — HIGH (ref 0–41)
BASOPHILS # BLD AUTO: 0.04 K/UL — SIGNIFICANT CHANGE UP (ref 0–0.2)
BASOPHILS NFR BLD AUTO: 0.4 % — SIGNIFICANT CHANGE UP (ref 0–1)
BILIRUB SERPL-MCNC: 1.9 MG/DL — HIGH (ref 0.2–1.2)
BUN SERPL-MCNC: 6 MG/DL — LOW (ref 10–20)
CALCIUM SERPL-MCNC: 8.8 MG/DL — SIGNIFICANT CHANGE UP (ref 8.5–10.1)
CHLORIDE SERPL-SCNC: 94 MMOL/L — LOW (ref 98–110)
CO2 SERPL-SCNC: 25 MMOL/L — SIGNIFICANT CHANGE UP (ref 17–32)
CREAT SERPL-MCNC: 0.7 MG/DL — SIGNIFICANT CHANGE UP (ref 0.7–1.5)
EOSINOPHIL # BLD AUTO: 0.05 K/UL — SIGNIFICANT CHANGE UP (ref 0–0.7)
EOSINOPHIL NFR BLD AUTO: 0.4 % — SIGNIFICANT CHANGE UP (ref 0–8)
GLUCOSE SERPL-MCNC: 111 MG/DL — HIGH (ref 70–99)
HCT VFR BLD CALC: 43.6 % — SIGNIFICANT CHANGE UP (ref 42–52)
HGB BLD-MCNC: 15.4 G/DL — SIGNIFICANT CHANGE UP (ref 14–18)
IMM GRANULOCYTES NFR BLD AUTO: 0.5 % — HIGH (ref 0.1–0.3)
LACTATE SERPL-SCNC: 1.2 MMOL/L — SIGNIFICANT CHANGE UP (ref 0.5–2.2)
LYMPHOCYTES # BLD AUTO: 1.45 K/UL — SIGNIFICANT CHANGE UP (ref 1.2–3.4)
LYMPHOCYTES # BLD AUTO: 12.8 % — LOW (ref 20.5–51.1)
MAGNESIUM SERPL-MCNC: 2.1 MG/DL — SIGNIFICANT CHANGE UP (ref 1.8–2.4)
MCHC RBC-ENTMCNC: 31.8 PG — HIGH (ref 27–31)
MCHC RBC-ENTMCNC: 35.3 G/DL — SIGNIFICANT CHANGE UP (ref 32–37)
MCV RBC AUTO: 90.1 FL — SIGNIFICANT CHANGE UP (ref 80–94)
MONOCYTES # BLD AUTO: 0.92 K/UL — HIGH (ref 0.1–0.6)
MONOCYTES NFR BLD AUTO: 8.1 % — SIGNIFICANT CHANGE UP (ref 1.7–9.3)
NEUTROPHILS # BLD AUTO: 8.81 K/UL — HIGH (ref 1.4–6.5)
NEUTROPHILS NFR BLD AUTO: 77.8 % — HIGH (ref 42.2–75.2)
NRBC # BLD: 0 /100 WBCS — SIGNIFICANT CHANGE UP (ref 0–0)
PHOSPHATE SERPL-MCNC: 3.5 MG/DL — SIGNIFICANT CHANGE UP (ref 2.1–4.9)
PLATELET # BLD AUTO: 283 K/UL — SIGNIFICANT CHANGE UP (ref 130–400)
POTASSIUM SERPL-MCNC: 3.3 MMOL/L — LOW (ref 3.5–5)
POTASSIUM SERPL-SCNC: 3.3 MMOL/L — LOW (ref 3.5–5)
PROT SERPL-MCNC: 6.7 G/DL — SIGNIFICANT CHANGE UP (ref 6–8)
RBC # BLD: 4.84 M/UL — SIGNIFICANT CHANGE UP (ref 4.7–6.1)
RBC # FLD: 11.6 % — SIGNIFICANT CHANGE UP (ref 11.5–14.5)
SODIUM SERPL-SCNC: 134 MMOL/L — LOW (ref 135–146)
WBC # BLD: 11.33 K/UL — HIGH (ref 4.8–10.8)
WBC # FLD AUTO: 11.33 K/UL — HIGH (ref 4.8–10.8)

## 2018-08-30 RX ORDER — IBUPROFEN 200 MG
600 TABLET ORAL ONCE
Qty: 0 | Refills: 0 | Status: COMPLETED | OUTPATIENT
Start: 2018-08-30 | End: 2018-08-30

## 2018-08-30 RX ORDER — SODIUM CHLORIDE 9 MG/ML
1000 INJECTION INTRAMUSCULAR; INTRAVENOUS; SUBCUTANEOUS
Qty: 0 | Refills: 0 | Status: DISCONTINUED | OUTPATIENT
Start: 2018-08-30 | End: 2018-09-01

## 2018-08-30 RX ORDER — POTASSIUM CHLORIDE 20 MEQ
40 PACKET (EA) ORAL EVERY 4 HOURS
Qty: 0 | Refills: 0 | Status: COMPLETED | OUTPATIENT
Start: 2018-08-30 | End: 2018-08-30

## 2018-08-30 RX ORDER — ACETAMINOPHEN 500 MG
650 TABLET ORAL EVERY 6 HOURS
Qty: 0 | Refills: 0 | Status: DISCONTINUED | OUTPATIENT
Start: 2018-08-30 | End: 2018-09-01

## 2018-08-30 RX ADMIN — Medication 1 MILLIGRAM(S): at 17:31

## 2018-08-30 RX ADMIN — Medication 1 MILLIGRAM(S): at 13:22

## 2018-08-30 RX ADMIN — Medication 1 MILLIGRAM(S): at 10:00

## 2018-08-30 RX ADMIN — Medication 1 MILLIGRAM(S): at 21:33

## 2018-08-30 RX ADMIN — Medication 40 MILLIEQUIVALENT(S): at 13:24

## 2018-08-30 RX ADMIN — Medication 1 MILLIGRAM(S): at 11:29

## 2018-08-30 RX ADMIN — Medication 1 MILLIGRAM(S): at 02:08

## 2018-08-30 RX ADMIN — Medication 100 MILLIGRAM(S): at 11:29

## 2018-08-30 RX ADMIN — Medication 600 MILLIGRAM(S): at 13:22

## 2018-08-30 RX ADMIN — Medication 40 MILLIEQUIVALENT(S): at 17:31

## 2018-08-30 RX ADMIN — PANTOPRAZOLE SODIUM 40 MILLIGRAM(S): 20 TABLET, DELAYED RELEASE ORAL at 06:00

## 2018-08-30 RX ADMIN — Medication 1 TABLET(S): at 11:29

## 2018-08-30 RX ADMIN — SODIUM CHLORIDE 125 MILLILITER(S): 9 INJECTION INTRAMUSCULAR; INTRAVENOUS; SUBCUTANEOUS at 10:01

## 2018-08-30 RX ADMIN — Medication 1 MILLIGRAM(S): at 06:00

## 2018-08-30 RX ADMIN — Medication 600 MILLIGRAM(S): at 13:55

## 2018-08-30 NOTE — PROGRESS NOTE ADULT - SUBJECTIVE AND OBJECTIVE BOX
MODE CHILDERS  27y  Male    Patient is a 27y old  Male who presents with a chief complaint of alcohol Intoxication (28 Aug 2018 23:42)      INTERVAL HPI/OVERNIGHT EVENTS:  No interval events. Patient complaining headache.  Insomnia, epigastric pain and nausea resolved.      REVIEW OF SYSTEMS:  CONSTITUTIONAL: No fever, weight loss, or fatigue  EYES: No eye pain, visual disturbances, or discharge  ENMT:  No difficulty hearing, tinnitus, vertigo; No sinus or throat pain  NECK: No pain or stiffness  BREASTS: No pain, masses, or nipple discharge  RESPIRATORY: No cough, wheezing, chills or hemoptysis; No shortness of breath  CARDIOVASCULAR: No chest pain, palpitations, dizziness, or leg swelling  GASTROINTESTINAL: No epigastric pain. No nausea, No vomiting, or hematemesis; No diarrhea or constipation. No melena or hematochezia.  GENITOURINARY: No dysuria, frequency, hematuria, or incontinence  NEUROLOGICAL: No headaches, memory loss, loss of strength, numbness, or tremors  SKIN: No itching, burning, rashes, or lesions   LYMPH NODES: No enlarged glands  ENDOCRINE: No heat or cold intolerance; No hair loss  MUSCULOSKELETAL: No joint pain or swelling; No muscle, back, or extremity pain  PSYCHIATRIC: No depression, anxiety, mood swings, + difficulty sleeping  HEME/LYMPH: No easy bruising, or bleeding gums  ALLERGY AND IMMUNOLOGIC: No hives or eczema      VITALS:  T(C): 36.8 (30 Aug 2018 05:21), Max: 37.1 (29 Aug 2018 14:00)  T(F): 98.2 (30 Aug 2018 05:21), Max: 98.7 (29 Aug 2018 14:00)  HR: 96 (30 Aug 2018 09:40) (64 - 96)  BP: 146/94 (30 Aug 2018 09:40) (133/76 - 162/94)  BP(mean): --  RR: 18 (30 Aug 2018 09:38) (18 - 18)  SpO2: --      PHYSICAL EXAM:  GENERAL: NAD  HEAD:  Atraumatic, Normocephalic  EYES: conjunctiva and sclera clear  ENMT: Moist mucous membranes, Good dentition, No lesions  NECK: Supple, Normal thyroid  NERVOUS SYSTEM:  Alert & Oriented X 3, Good concentration; Motor Strength 5/5 B/L upper and lower extremities  CHEST/LUNG: Clear to auscultation bilaterally; No rales, rhonchi, wheezing, or rubs  HEART: Regular rate and rhythm; No murmurs, rubs, or gallops  ABDOMEN: Soft, Nontender, Nondistended; Bowel sounds present  EXTREMITIES:  2+ Peripheral Pulses, No clubbing, cyanosis, or edema  LYMPH: No lymphadenopathy noted  SKIN: No rashes or lesions    Consultant(s) Notes Reviewed:  [x ] YES  [ ] NO  Care Discussed with Consultants/Other Providers [ x] YES  [ ] NO    LABS:                                   15.4   11.33 )-----------( 283      ( 30 Aug 2018 08:57 )             43.6         134<L>  |  94<L>  |  6<L>  ----------------------------<  111<H>  3.3<L>   |  25  |  0.7    Ca    8.8      30 Aug 2018 08:57  Phos  3.5       Mg     2.1         TPro  6.7  /  Alb  4.1  /  TBili  1.9<H>  /  DBili  x   /  AST  42<H>  /  ALT  35  /  AlkPhos  117<H>        Acetaminophen Level, Serum: <5.0 ug/mL (18 @ 14:20)  Salicylate Level, Serum: <0.3 mg/dL (18 @ 14:20)  Alcohol, Blood: 329 mg/dL (18 @ 14:20)      Blood Gas Venous - Lactate: 5.6 mmoL/L (18 @ 14:20)  Lactate, Blood: 3.9 mmol/L (18 @ 20:45)  Lactate, Blood: 2.9 mmol/L (18 @ 06:56)  Lactate, Blood: 1.2 mmol/L (18 @ 08:57)    Urinalysis Basic - ( 28 Aug 2018 22:54 )    Color: Yellow / Appearance: Clear / S.015 / pH: x  Gluc: x / Ketone: Negative  / Bili: Negative / Urobili: 0.2 mg/dL   Blood: x / Protein: Negative mg/dL / Nitrite: Negative   Leuk Esterase: Negative / RBC: x / WBC x   Sq Epi: x / Non Sq Epi: x / Bacteria: x      MICROBIOLOGY: None      RADIOLOGY & ADDITIONAL TESTS:  X-ray Chest 2 Views PA/Lat (18 @ 17:39)   Support devices: None.    Cardiac/mediastinum/hilum: Unremarkable.    Lung parenchyma/Pleura: Within normal limits. Hyperinflation.    Skeleton/soft tissues: Levoscoliosis.    Impression:      No radiographic evidence of acute cardiopulmonary disease.      CT Abdomen and Pelvis w/ IV Cont (18 @ 23:09)   LOWER CHEST: Unremarkable.    HEPATOBILIARY: The liver is normal in appearance.  No evidence of intra   or extrahepatic biliary dilatation. The gallbladder is suboptimally   imaged.    SPLEEN: Unremarkable.    PANCREAS: Unremarkable.    ADRENAL GLANDS: Unremarkable.    KIDNEYS: Symmetric pattern of renal enhancement. No evidence of a mass,   hydronephrosis or hydroureter.    ABDOMINOPELVIC NODES: No enlarged abdominal or pelvic lymph nodes.    PELVIC ORGANS: Unremarkable.    PERITONEUM/MESENTERY/BOWEL: No bowel obstruction, ascites or free   intraperitoneal air. The appendix is unremarkable.    BONES/SOFT TISSUES: There is a well-corticated defect involving the   posterior aspect of the superior L4 endplate with small ossicle noted   slightly more posteriorly. 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 Superimposed disc herniation is noted.    VASCULAR:  The aorta is normal in caliber.      IMPRESSION:      No CT evidence of acute intra-abdominal pathology.      Imaging Personally Reviewed:  [x] YES  [ ] NO    MEDICATIONS  (STANDING):  folic acid 1 milliGRAM(s) Oral daily  ibuprofen  Tablet 600 milliGRAM(s) Oral once  LORazepam     Tablet   Oral   LORazepam     Tablet 1 milliGRAM(s) Oral every 4 hours  multivitamin 1 Tablet(s) Oral daily  pantoprazole    Tablet 40 milliGRAM(s) Oral before breakfast  potassium chloride    Tablet ER 40 milliEquivalent(s) Oral every 4 hours  sodium chloride 0.9%. 1000 milliLiter(s) (125 mL/Hr) IV Continuous <Continuous>  sodium chloride 0.9%. 1000 milliLiter(s) (100 mL/Hr) IV Continuous <Continuous>  thiamine 100 milliGRAM(s) Oral daily    MEDICATIONS  (PRN):  acetaminophen   Tablet. 650 milliGRAM(s) Oral every 6 hours PRN headache  ondansetron Injectable 4 milliGRAM(s) IV Push every 6 hours PRN Nausea and/or Vomiting          HEALTH ISSUES - PROBLEM Dx:  Elevated lactic acid level  Leukocytosis, unspecified type  Alcoholic intoxication with complication

## 2018-08-30 NOTE — PROGRESS NOTE ADULT - ASSESSMENT
Patient is a 28yo male with h/o Alcohol dependence, presented to the ER intoxicated requesting alcohol detox (per ER documents) however during evaluation he was noted to have elevated lactate level & leukocytosis of 20k. Patient had no other complaints except persistent nausea without vomiting and epigastric pain. He mentions drinking ~ 12 cans of Mexican beer every day with last drink being the morning of presentation. He denies any other drug use and has no Psychiatric problems.      Assessment and Plan:   Problem/Plan - 1:  ·  Problem: Alcoholic intoxication with complication.    Plan: Continue Ativan withdrawal protocol,. On thiamine, folic acid and MVI.   Transfer to detox upon medical stabilization.      Problem/Plan - 2:  ·  Problem: Leukocytosis, unspecified type.    Plan: No obvious source of Infection. Levels trended down.  Continue monitoring.       Problem/Plan - 3:  ·  Problem: Elevated lactic acid level.    Plan: Continue IV fluids. Levels trended down.       Problem/Plan - 4:  ·  Problem: Elevated Liver enzymes.   Plan: Mildly elevated Enzymes. CT scan negative any hepatobiliary abnormality.  Follow up Ultrasound. Trend liver enzymes.      DVT prophylaxis: Heparin.  Disposition: Detox when stable.

## 2018-08-31 LAB
ALBUMIN SERPL ELPH-MCNC: 3.7 G/DL — SIGNIFICANT CHANGE UP (ref 3.5–5.2)
ALP SERPL-CCNC: 93 U/L — SIGNIFICANT CHANGE UP (ref 30–115)
ALT FLD-CCNC: 31 U/L — SIGNIFICANT CHANGE UP (ref 0–41)
ANION GAP SERPL CALC-SCNC: 12 MMOL/L — SIGNIFICANT CHANGE UP (ref 7–14)
AST SERPL-CCNC: 33 U/L — SIGNIFICANT CHANGE UP (ref 0–41)
BILIRUB SERPL-MCNC: 0.7 MG/DL — SIGNIFICANT CHANGE UP (ref 0.2–1.2)
BUN SERPL-MCNC: 6 MG/DL — LOW (ref 10–20)
CALCIUM SERPL-MCNC: 8.3 MG/DL — LOW (ref 8.5–10.1)
CHLORIDE SERPL-SCNC: 100 MMOL/L — SIGNIFICANT CHANGE UP (ref 98–110)
CO2 SERPL-SCNC: 23 MMOL/L — SIGNIFICANT CHANGE UP (ref 17–32)
CREAT SERPL-MCNC: 0.6 MG/DL — LOW (ref 0.7–1.5)
GLUCOSE SERPL-MCNC: 106 MG/DL — HIGH (ref 70–99)
HAV IGM SER-ACNC: SIGNIFICANT CHANGE UP
HBV CORE IGM SER-ACNC: SIGNIFICANT CHANGE UP
HBV SURFACE AG SER-ACNC: SIGNIFICANT CHANGE UP
HCT VFR BLD CALC: 39.9 % — LOW (ref 42–52)
HCV AB S/CO SERPL IA: 0.09 S/CO — SIGNIFICANT CHANGE UP
HCV AB SERPL-IMP: SIGNIFICANT CHANGE UP
HGB BLD-MCNC: 14.3 G/DL — SIGNIFICANT CHANGE UP (ref 14–18)
MCHC RBC-ENTMCNC: 32.4 PG — HIGH (ref 27–31)
MCHC RBC-ENTMCNC: 35.8 G/DL — SIGNIFICANT CHANGE UP (ref 32–37)
MCV RBC AUTO: 90.3 FL — SIGNIFICANT CHANGE UP (ref 80–94)
NRBC # BLD: 0 /100 WBCS — SIGNIFICANT CHANGE UP (ref 0–0)
PLATELET # BLD AUTO: 259 K/UL — SIGNIFICANT CHANGE UP (ref 130–400)
POTASSIUM SERPL-MCNC: 3.7 MMOL/L — SIGNIFICANT CHANGE UP (ref 3.5–5)
POTASSIUM SERPL-SCNC: 3.7 MMOL/L — SIGNIFICANT CHANGE UP (ref 3.5–5)
PROT SERPL-MCNC: 6 G/DL — SIGNIFICANT CHANGE UP (ref 6–8)
RBC # BLD: 4.42 M/UL — LOW (ref 4.7–6.1)
RBC # FLD: 11.6 % — SIGNIFICANT CHANGE UP (ref 11.5–14.5)
SODIUM SERPL-SCNC: 135 MMOL/L — SIGNIFICANT CHANGE UP (ref 135–146)
WBC # BLD: 6.67 K/UL — SIGNIFICANT CHANGE UP (ref 4.8–10.8)
WBC # FLD AUTO: 6.67 K/UL — SIGNIFICANT CHANGE UP (ref 4.8–10.8)

## 2018-08-31 RX ORDER — FOLIC ACID 0.8 MG
1 TABLET ORAL
Qty: 0 | Refills: 0 | COMMUNITY
Start: 2018-08-31

## 2018-08-31 RX ORDER — ACETAMINOPHEN 500 MG
2 TABLET ORAL
Qty: 0 | Refills: 0 | COMMUNITY
Start: 2018-08-31

## 2018-08-31 RX ORDER — PANTOPRAZOLE SODIUM 20 MG/1
1 TABLET, DELAYED RELEASE ORAL
Qty: 0 | Refills: 0 | COMMUNITY
Start: 2018-08-31

## 2018-08-31 RX ORDER — ONDANSETRON 8 MG/1
1 TABLET, FILM COATED ORAL
Qty: 40 | Refills: 0 | OUTPATIENT
Start: 2018-08-31 | End: 2018-09-09

## 2018-08-31 RX ORDER — THIAMINE MONONITRATE (VIT B1) 100 MG
1 TABLET ORAL
Qty: 0 | Refills: 0 | COMMUNITY
Start: 2018-08-31

## 2018-08-31 RX ADMIN — SODIUM CHLORIDE 125 MILLILITER(S): 9 INJECTION INTRAMUSCULAR; INTRAVENOUS; SUBCUTANEOUS at 09:34

## 2018-08-31 RX ADMIN — Medication 0.5 MILLIGRAM(S): at 05:09

## 2018-08-31 RX ADMIN — SODIUM CHLORIDE 125 MILLILITER(S): 9 INJECTION INTRAMUSCULAR; INTRAVENOUS; SUBCUTANEOUS at 19:26

## 2018-08-31 RX ADMIN — Medication 0.5 MILLIGRAM(S): at 13:52

## 2018-08-31 RX ADMIN — Medication 0.5 MILLIGRAM(S): at 02:20

## 2018-08-31 RX ADMIN — Medication 0.5 MILLIGRAM(S): at 17:33

## 2018-08-31 RX ADMIN — Medication 100 MILLIGRAM(S): at 11:23

## 2018-08-31 RX ADMIN — Medication 0.5 MILLIGRAM(S): at 21:47

## 2018-08-31 RX ADMIN — Medication 0.5 MILLIGRAM(S): at 10:10

## 2018-08-31 RX ADMIN — Medication 1 TABLET(S): at 11:22

## 2018-08-31 RX ADMIN — PANTOPRAZOLE SODIUM 40 MILLIGRAM(S): 20 TABLET, DELAYED RELEASE ORAL at 06:14

## 2018-08-31 RX ADMIN — Medication 1 MILLIGRAM(S): at 11:23

## 2018-08-31 NOTE — DISCHARGE NOTE ADULT - OTHER SIGNIFICANT FINDINGS
LABS:                                              14.3   6.67  )-----------( 259      ( 31 Aug 2018 06:45 )             39.9         135  |  100  |  6<L>  ----------------------------<  106<H>  3.7   |  23  |  0.6<L>    Ca    8.3<L>      31 Aug 2018 06:45  Phos  3.5       Mg     2.1         TPro  6.0  /  Alb  3.7  /  TBili  0.7  /  DBili  x   /  AST  33  /  ALT  31  /  AlkPhos  93        Acetaminophen Level, Serum: <5.0 ug/mL (18 @ 14:20)  Salicylate Level, Serum: <0.3 mg/dL (18 @ 14:20)  Alcohol, Blood: 329 mg/dL (18 @ 14:20)      Blood Gas Venous - Lactate: 5.6 mmoL/L (18 @ 14:20)  Lactate, Blood: 3.9 mmol/L (18 @ 20:45)  Lactate, Blood: 2.9 mmol/L (18 @ 06:56)  Lactate, Blood: 1.2 mmol/L (18 @ 08:57)    Urinalysis Basic - ( 28 Aug 2018 22:54 )    Color: Yellow / Appearance: Clear / S.015 / pH: x  Gluc: x / Ketone: Negative  / Bili: Negative / Urobili: 0.2 mg/dL   Blood: x / Protein: Negative mg/dL / Nitrite: Negative   Leuk Esterase: Negative / RBC: x / WBC x   Sq Epi: x / Non Sq Epi: x / Bacteria: x      MICROBIOLOGY: None      RADIOLOGY & ADDITIONAL TESTS:  US Abdomen Limited (18 @ 15:04)   LIVER:  Normal in contour and echogenicity measuring 17.5 cm in length.   No focal mass.    GALLBLADDER/BILIARY TREE:  No evidence of cholelithiasis. No wall   thickening or pericholecystic fluid.  Negative sonographic Garcia's sign.   No intrahepatic biliary ductal dilatation. The common bile duct measures   5.5 mm, which is within normal limits.     PANCREAS:  Visualized head and body are unremarkable. Remainder obscured   by overlying bowel gas.    KIDNEY:  Right kidney measures 11 cm in length. No hydronephrosis,   calculi or solid mass.    AORTA/IVC:  Visualized proximal portions unremarkable.    ASCITES:  None.      Unremarkable right upper quadrant ultrasound.        X-ray Chest 2 Views PA/Lat (18 @ 17:39)   No radiographic evidence of acute cardiopulmonary disease.      CT Abdomen and Pelvis w/ IV Cont (18 @ 23:09)   HEPATOBILIARY: The liver is normal in appearance.  No evidence of intra   or extrahepatic biliary dilatation. The gallbladder is suboptimally   imaged.    KIDNEYS: Symmetric pattern of renal enhancement. No evidence of a mass,   hydronephrosis or hydroureter.    ABDOMINOPELVIC NODES: No enlarged abdominal or pelvic lymph nodes.    PERITONEUM/MESENTERY/BOWEL: No bowel obstruction, ascites or free   intraperitoneal air. The appendix is unremarkable.    BONES/SOFT TISSUES: There is a well-corticated defect involving the   posterior aspect of the superior L4 endplate with small ossicle noted   slightly more posteriorly. 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 Superimposed disc herniation is noted.      IMPRESSION:      No CT evidence of acute intra-abdominal pathology.

## 2018-08-31 NOTE — DISCHARGE NOTE ADULT - HOSPITAL COURSE
Patient is a 26yo male with h/o Alcohol dependence, presented to the ER intoxicated requesting alcohol detox (per ER documents) however during evaluation he was noted to have elevated lactate level & leukocytosis of 20k. Patient had no other complaints except persistent nausea without vomiting and epigastric pain. He mentions drinking ~ 12 cans of Mexican beer every day with last drink being the morning of presentation. He denies any other drug use and has no Psychiatric problems.      Assessment and Plan:   Problem/Plan - 1:  ·  Problem: Alcoholic intoxication with complication.    Plan: Started on Ativan withdrawal protocol. On thiamine, folic acid and MVI.   Stable to be discharged to DETOX today.      Problem/Plan - 2:  ·  Problem: Leukocytosis, unspecified type.    Plan: No obvious source of Infection. Levels trended down; now wnl.          Problem/Plan - 3:  ·  Problem: Elevated lactic acid level.    Plan: Discontinued IV fluids. Levels trended down.   Encourage liberal oral fluids.      Problem/Plan - 4:  ·  Problem: Elevated Liver enzymes.   Plan: Mildly elevated Enzymes. Resolved. CT scan negative any hepatobiliary abnormality.  Unremarkable Ultrasound.

## 2018-08-31 NOTE — DISCHARGE NOTE ADULT - PATIENT PORTAL LINK FT
You can access the [facility], offered by [facility], by registering with the following website: [email or web address]

## 2018-08-31 NOTE — DISCHARGE NOTE ADULT - PROVIDER TOKENS
FREE:[LAST:[PMD],PHONE:[(   )    -],FAX:[(   )    -]] FREE:[LAST:[PMD],PHONE:[(   )    -],FAX:[(   )    -]],FREE:[LAST:[[facility] ([facility] the [facility])],PHONE:[(   )    -],FAX:[(   )    -]]

## 2018-08-31 NOTE — PROGRESS NOTE ADULT - SUBJECTIVE AND OBJECTIVE BOX
[employer]  27y  Male    Patient is a 27y old  Male who presents with a chief complaint of alcohol Intoxication (28 Aug 2018 23:42)      INTERVAL HPI/OVERNIGHT EVENTS:  No interval events. Patient has no complaints.       REVIEW OF SYSTEMS:  CONSTITUTIONAL: No fever, weight loss, or fatigue  EYES: No eye pain, visual disturbances, or discharge  ENMT:  No difficulty hearing, tinnitus, vertigo; No sinus or throat pain  NECK: No pain or stiffness  BREASTS: No pain, masses, or nipple discharge  RESPIRATORY: No cough, wheezing, chills or hemoptysis; No shortness of breath  CARDIOVASCULAR: No chest pain, palpitations, dizziness, or leg swelling  GASTROINTESTINAL: No epigastric pain. No nausea, No vomiting, or hematemesis; No diarrhea or constipation. No melena or hematochezia.  GENITOURINARY: No dysuria, frequency, hematuria, or incontinence  NEUROLOGICAL: No headaches, memory loss, loss of strength, numbness, or tremors  SKIN: No itching, burning, rashes, or lesions   LYMPH NODES: No enlarged glands  ENDOCRINE: No heat or cold intolerance; No hair loss  MUSCULOSKELETAL: No joint pain or swelling; No muscle, back, or extremity pain  PSYCHIATRIC: No depression, anxiety, mood swings, No difficulty sleeping  HEME/LYMPH: No easy bruising, or bleeding gums  ALLERGY AND IMMUNOLOGIC: No hives or eczema      VITALS:  T(C): 36.3 (31 Aug 2018 06:18), Max: 36.9 (30 Aug 2018 14:00)  T(F): 97.4 (31 Aug 2018 06:18), Max: 98.4 (30 Aug 2018 14:00)  HR: 61 (31 Aug 2018 06:18) (61 - 96)  BP: 120/75 (31 Aug 2018 06:18) (120/75 - 162/94)  BP(mean): --  RR: 16 (31 Aug 2018 06:18) (16 - 18)  SpO2: --      PHYSICAL EXAM:  GENERAL: NAD  HEAD:  Atraumatic, Normocephalic  EYES: conjunctiva and sclera clear  ENMT: Moist mucous membranes, Good dentition, No lesions  NECK: Supple, Normal thyroid  NERVOUS SYSTEM:  Alert & Oriented X 3, Good concentration; Motor Strength 5/5 B/L upper and lower extremities  CHEST/LUNG: Clear to auscultation bilaterally; No rales, rhonchi, wheezing, or rubs  HEART: Regular rate and rhythm; No murmurs, rubs, or gallops  ABDOMEN: Soft, Nontender, Nondistended; Bowel sounds present  EXTREMITIES:  2+ Peripheral Pulses, No clubbing, cyanosis, or edema  LYMPH: No lymphadenopathy noted  SKIN: No rashes or lesions    Consultant(s) Notes Reviewed:  [x ] YES  [ ] NO  Care Discussed with Consultants/Other Providers [ x] YES  [ ] NO    LABS:                                              14.3   6.67  )-----------( 259      ( 31 Aug 2018 06:45 )             39.9         135  |  100  |  6<L>  ----------------------------<  106<H>  3.7   |  23  |  0.6<L>    Ca    8.3<L>      31 Aug 2018 06:45  Phos  3.5       Mg     2.1         TPro  6.0  /  Alb  3.7  /  TBili  0.7  /  DBili  x   /  AST  33  /  ALT  31  /  AlkPhos  93        Acetaminophen Level, Serum: <5.0 ug/mL (18 @ 14:20)  Salicylate Level, Serum: <0.3 mg/dL (18 @ 14:20)  Alcohol, Blood: 329 mg/dL (18 @ 14:20)      Blood Gas Venous - Lactate: 5.6 mmoL/L (18 @ 14:20)  Lactate, Blood: 3.9 mmol/L (18 @ 20:45)  Lactate, Blood: 2.9 mmol/L (18 @ 06:56)  Lactate, Blood: 1.2 mmol/L (18 @ 08:57)    Urinalysis Basic - ( 28 Aug 2018 22:54 )    Color: Yellow / Appearance: Clear / S.015 / pH: x  Gluc: x / Ketone: Negative  / Bili: Negative / Urobili: 0.2 mg/dL   Blood: x / Protein: Negative mg/dL / Nitrite: Negative   Leuk Esterase: Negative / RBC: x / WBC x   Sq Epi: x / Non Sq Epi: x / Bacteria: x      MICROBIOLOGY: None      RADIOLOGY & ADDITIONAL TESTS:  US Abdomen Limited (18 @ 15:04)   LIVER:  Normal in contour and echogenicity measuring 17.5 cm in length.   No focal mass.    GALLBLADDER/BILIARY TREE:  No evidence of cholelithiasis. No wall   thickening or pericholecystic fluid.  Negative sonographic Garcia's sign.   No intrahepatic biliary ductal dilatation. The common bile duct measures   5.5 mm, which is within normal limits.     PANCREAS:  Visualized head and body are unremarkable. Remainder obscured   by overlying bowel gas.    KIDNEY:  Right kidney measures 11 cm in length. No hydronephrosis,   calculi or solid mass.    AORTA/IVC:  Visualized proximal portions unremarkable.    ASCITES:  None.    IMPRESSION:    Unremarkable right upper quadrant ultrasound.        X-ray Chest 2 Views PA/Lat (18 @ 17:39)   Support devices: None.    Cardiac/mediastinum/hilum: Unremarkable.    Lung parenchyma/Pleura: Within normal limits. Hyperinflation.    Skeleton/soft tissues: Levoscoliosis.    Impression:      No radiographic evidence of acute cardiopulmonary disease.      CT Abdomen and Pelvis w/ IV Cont (18 @ 23:09)   LOWER CHEST: Unremarkable.    HEPATOBILIARY: The liver is normal in appearance.  No evidence of intra   or extrahepatic biliary dilatation. The gallbladder is suboptimally   imaged.    SPLEEN: Unremarkable.    PANCREAS: Unremarkable.    ADRENAL GLANDS: Unremarkable.    KIDNEYS: Symmetric pattern of renal enhancement. No evidence of a mass,   hydronephrosis or hydroureter.    ABDOMINOPELVIC NODES: No enlarged abdominal or pelvic lymph nodes.    PELVIC ORGANS: Unremarkable.    PERITONEUM/MESENTERY/BOWEL: No bowel obstruction, ascites or free   intraperitoneal air. The appendix is unremarkable.    BONES/SOFT TISSUES: There is a well-corticated defect involving the   posterior aspect of the superior L4 endplate with small ossicle noted   slightly more posteriorly. 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 Superimposed disc herniation is noted.    VASCULAR:  The aorta is normal in caliber.      IMPRESSION:      No CT evidence of acute intra-abdominal pathology.      Imaging Personally Reviewed:  [x] YES  [ ] NO    MEDICATIONS  (STANDING):  folic acid 1 milliGRAM(s) Oral daily  LORazepam     Tablet   Oral   LORazepam     Tablet 0.5 milliGRAM(s) Oral every 4 hours  multivitamin 1 Tablet(s) Oral daily  pantoprazole    Tablet 40 milliGRAM(s) Oral before breakfast  sodium chloride 0.9%. 1000 milliLiter(s) (125 mL/Hr) IV Continuous <Continuous>  sodium chloride 0.9%. 1000 milliLiter(s) (100 mL/Hr) IV Continuous <Continuous>  thiamine 100 milliGRAM(s) Oral daily    MEDICATIONS  (PRN):  acetaminophen   Tablet. 650 milliGRAM(s) Oral every 6 hours PRN headache  ondansetron Injectable 4 milliGRAM(s) IV Push every 6 hours PRN Nausea and/or Vomiting          HEALTH ISSUES - PROBLEM Dx:  Elevated lactic acid level  Leukocytosis, unspecified type  Alcoholic intoxication with complication

## 2018-08-31 NOTE — PROGRESS NOTE ADULT - ASSESSMENT
Patient is a 28yo male with h/o Alcohol dependence, presented to the ER intoxicated requesting alcohol detox (per ER documents) however during evaluation he was noted to have elevated lactate level & leukocytosis of 20k. Patient had no other complaints except persistent nausea without vomiting and epigastric pain. He mentions drinking ~ 12 cans of Mexican beer every day with last drink being the morning of presentation. He denies any other drug use and has no Psychiatric problems.      Assessment and Plan:   Problem/Plan - 1:  ·  Problem: Alcoholic intoxication with complication.    Plan: Continue Ativan withdrawal protocol,. On thiamine, folic acid and MVI.   Transfer to detox today.      Problem/Plan - 2:  ·  Problem: Leukocytosis, unspecified type.    Plan: No obvious source of Infection. Levels trended down.  Continue monitoring.         Problem/Plan - 3:  ·  Problem: Elevated lactic acid level.    Plan: Discontinue IV fluids. Levels trended down.         Problem/Plan - 4:  ·  Problem: Elevated Liver enzymes.   Plan: Mildly elevated Enzymes. Resolved. CT scan negative any hepatobiliary abnormality.  Unremarkable Ultrasound.       DVT prophylaxis: Heparin.  Disposition: Discharge to Detox Today.

## 2018-08-31 NOTE — DISCHARGE NOTE ADULT - MEDICATION SUMMARY - MEDICATIONS TO TAKE
I will START or STAY ON the medications listed below when I get home from the hospital:    acetaminophen 325 mg oral tablet  -- 2 tab(s) by mouth every 6 hours, As needed, headache  -- Indication: For Pain /Fever    LORazepam  -- Taper Alcohol withdrawal protocol  -- Indication: For Alcohol withdrawal    Zofran ODT 4 mg oral tablet, disintegrating  -- 1 tab(s) by mouth every 6 hours, As Needed   -- Indication: For Nausea/Vomiting    pantoprazole 40 mg oral delayed release tablet  -- 1 tab(s) by mouth once a day (before a meal)  -- Indication: For GERD    Multiple Vitamins oral tablet  -- 1 tab(s) by mouth once a day  -- Indication: For Supplement    folic acid 1 mg oral tablet  -- 1 tab(s) by mouth once a day  -- Indication: For Supplement    thiamine 100 mg oral tablet  -- 1 tab(s) by mouth once a day  -- Indication: For Supplement I will START or STAY ON the medications listed below when I get home from the hospital:    acetaminophen 325 mg oral tablet  -- 2 tab(s) by mouth every 6 hours, As needed, headache  -- Indication: For Pain /Fever    Zofran ODT 4 mg oral tablet, disintegrating  -- 1 tab(s) by mouth every 6 hours, As Needed   -- Indication: For Nausea/Vomiting    pantoprazole 40 mg oral delayed release tablet  -- 1 tab(s) by mouth once a day (before a meal)  -- Indication: For GERD    Multiple Vitamins oral tablet  -- 1 tab(s) by mouth once a day  -- Indication: For Supplement    folic acid 1 mg oral tablet  -- 1 tab(s) by mouth once a day  -- Indication: For Supplement    thiamine 100 mg oral tablet  -- 1 tab(s) by mouth once a day  -- Indication: For Supplement

## 2018-08-31 NOTE — DISCHARGE NOTE ADULT - CARE PROVIDER_API CALL
MEAGAN,   Phone: (   )    -  Fax: (   )    - [facility],   Phone: (   )    -  Fax: (   )    -    [facility] ([address] the [address]),   Phone: (   )    -  Fax: (   )    -

## 2018-08-31 NOTE — DISCHARGE NOTE ADULT - PLAN OF CARE
Cessation Complete withdrawal protocol.  Counselling. Completed withdrawal protocol.  Please follow up at [facility] across the street on Tuesday for assistance in quitting Alcohol use.

## 2018-08-31 NOTE — DISCHARGE NOTE ADULT - CARE PLAN
Principal Discharge DX:	Alcohol intoxication  Goal:	Cessation  Assessment and plan of treatment:	Complete withdrawal protocol.  Counselling. Principal Discharge DX:	Alcohol intoxication  Goal:	Cessation  Assessment and plan of treatment:	Completed withdrawal protocol.  Please follow up at [facility] across the street on Tuesday for assistance in quitting Alcohol use.

## 2018-09-01 VITALS
RESPIRATION RATE: 16 BRPM | DIASTOLIC BLOOD PRESSURE: 78 MMHG | SYSTOLIC BLOOD PRESSURE: 122 MMHG | HEART RATE: 74 BPM | TEMPERATURE: 97 F

## 2018-09-01 RX ORDER — THIAMINE MONONITRATE (VIT B1) 100 MG
1 TABLET ORAL
Qty: 30 | Refills: 0 | OUTPATIENT
Start: 2018-09-01 | End: 2018-09-30

## 2018-09-01 RX ORDER — FOLIC ACID 0.8 MG
1 TABLET ORAL
Qty: 30 | Refills: 0 | OUTPATIENT
Start: 2018-09-01 | End: 2018-09-30

## 2018-09-01 RX ORDER — ONDANSETRON 8 MG/1
1 TABLET, FILM COATED ORAL
Qty: 40 | Refills: 0 | OUTPATIENT
Start: 2018-09-01 | End: 2018-09-10

## 2018-09-01 RX ORDER — PANTOPRAZOLE SODIUM 20 MG/1
1 TABLET, DELAYED RELEASE ORAL
Qty: 30 | Refills: 0 | OUTPATIENT
Start: 2018-09-01 | End: 2018-09-30

## 2018-09-01 RX ADMIN — PANTOPRAZOLE SODIUM 40 MILLIGRAM(S): 20 TABLET, DELAYED RELEASE ORAL at 06:07

## 2018-09-01 NOTE — PROGRESS NOTE ADULT - ASSESSMENT
Patient is a 26yo male with h/o Alcohol dependence, presented to the ER intoxicated requesting alcohol detox (per ER documents) however during evaluation he was noted to have elevated lactate level & leukocytosis of 20k. Patient had no other complaints except persistent nausea without vomiting and epigastric pain. He mentions drinking ~ 12 cans of Mexican beer every day with last drink being the morning of presentation. He denies any other drug use and has no Psychiatric problems.      Assessment and Plan:   Problem/Plan - 1:  ·  Problem: Alcoholic intoxication with complication.    Plan: Completed Ativan withdrawal protocol. On thiamine, folic acid and MVI.   Out Patient follow up with [facility] for assistance in Alcohol cessation or central [facility] across the street.        Problem/Plan - 2:  ·  Problem: Leukocytosis, unspecified type.    Plan: No obvious source of Infection. Resolved.        Problem/Plan - 3:  ·  Problem: Elevated lactic acid level.    Plan: Discontinue IV fluids. Levels trended down.         Problem/Plan - 4:  ·  Problem: Elevated Liver enzymes.   Plan: Mildly elevated Enzymes. Resolved. CT scan negative any hepatobiliary abnormality.  Unremarkable Ultrasound.       DVT prophylaxis: Heparin.  Disposition: Discharge home Today.

## 2018-09-01 NOTE — PROGRESS NOTE ADULT - SUBJECTIVE AND OBJECTIVE BOX
[employer]  27y  Male    Patient is a 27y old  Male who presents with a chief complaint of alcohol Intoxication (28 Aug 2018 23:42)      INTERVAL HPI/OVERNIGHT EVENTS:  No interval events. Patient has no complaints.       REVIEW OF SYSTEMS:  CONSTITUTIONAL: No fever, weight loss, or fatigue  EYES: No eye pain, visual disturbances, or discharge  ENMT:  No difficulty hearing, tinnitus, vertigo; No sinus or throat pain  NECK: No pain or stiffness  BREASTS: No pain, masses, or nipple discharge  RESPIRATORY: No cough, wheezing, chills or hemoptysis; No shortness of breath  CARDIOVASCULAR: No chest pain, palpitations, dizziness, or leg swelling  GASTROINTESTINAL: No epigastric pain. No nausea, No vomiting, or hematemesis; No diarrhea or constipation. No melena or hematochezia.  GENITOURINARY: No dysuria, frequency, hematuria, or incontinence  NEUROLOGICAL: No headaches, memory loss, loss of strength, numbness, or tremors  SKIN: No itching, burning, rashes, or lesions   LYMPH NODES: No enlarged glands  ENDOCRINE: No heat or cold intolerance; No hair loss  MUSCULOSKELETAL: No joint pain or swelling; No muscle, back, or extremity pain  PSYCHIATRIC: No depression, anxiety, mood swings, No difficulty sleeping  HEME/LYMPH: No easy bruising, or bleeding gums  ALLERGY AND IMMUNOLOGIC: No hives or eczema      VITALS:  T(C): 36.3 (01 Sep 2018 06:00), Max: 36.9 (31 Aug 2018 14:28)  T(F): 97.3 (01 Sep 2018 06:00), Max: 98.5 (31 Aug 2018 14:28)  HR: 74 (01 Sep 2018 06:00) (71 - 82)  BP: 122/78 (01 Sep 2018 06:00) (122/78 - 139/71)  BP(mean): --  RR: 16 (01 Sep 2018 06:00) (16 - 16)  SpO2: --      PHYSICAL EXAM:  GENERAL: NAD  HEAD:  Atraumatic, Normocephalic  EYES: conjunctiva and sclera clear  ENMT: Moist mucous membranes, Good dentition, No lesions  NECK: Supple, Normal thyroid  NERVOUS SYSTEM:  Alert & Oriented X 3, Good concentration; Motor Strength 5/5 B/L upper and lower extremities  CHEST/LUNG: Clear to auscultation bilaterally; No rales, rhonchi, wheezing, or rubs  HEART: Regular rate and rhythm; No murmurs, rubs, or gallops  ABDOMEN: Soft, Nontender, Nondistended; Bowel sounds present  EXTREMITIES:  2+ Peripheral Pulses, No clubbing, cyanosis, or edema  LYMPH: No lymphadenopathy noted  SKIN: No rashes or lesions    Consultant(s) Notes Reviewed:  [x ] YES  [ ] NO  Care Discussed with Consultants/Other Providers [ x] YES  [ ] NO    LABS:                                              14.3   6.67  )-----------( 259      ( 31 Aug 2018 06:45 )             39.9         135  |  100  |  6<L>  ----------------------------<  106<H>  3.7   |  23  |  0.6<L>    Ca    8.3<L>      31 Aug 2018 06:45  Phos  3.5       Mg     2.1         TPro  6.0  /  Alb  3.7  /  TBili  0.7  /  DBili  x   /  AST  33  /  ALT  31  /  AlkPhos  93        Acetaminophen Level, Serum: <5.0 ug/mL (18 @ 14:20)  Salicylate Level, Serum: <0.3 mg/dL (18 @ 14:20)  Alcohol, Blood: 329 mg/dL (18 @ 14:20)      Blood Gas Venous - Lactate: 5.6 mmoL/L (18 @ 14:20)  Lactate, Blood: 3.9 mmol/L (18 @ 20:45)  Lactate, Blood: 2.9 mmol/L (18 @ 06:56)  Lactate, Blood: 1.2 mmol/L (18 @ 08:57)    Urinalysis Basic - ( 28 Aug 2018 22:54 )    Color: Yellow / Appearance: Clear / S.015 / pH: x  Gluc: x / Ketone: Negative  / Bili: Negative / Urobili: 0.2 mg/dL   Blood: x / Protein: Negative mg/dL / Nitrite: Negative   Leuk Esterase: Negative / RBC: x / WBC x   Sq Epi: x / Non Sq Epi: x / Bacteria: x      MICROBIOLOGY: None      RADIOLOGY & ADDITIONAL TESTS:  US Abdomen Limited (18 @ 15:04)   LIVER:  Normal in contour and echogenicity measuring 17.5 cm in length.   No focal mass.    GALLBLADDER/BILIARY TREE:  No evidence of cholelithiasis. No wall   thickening or pericholecystic fluid.  Negative sonographic Garcia's sign.   No intrahepatic biliary ductal dilatation. The common bile duct measures   5.5 mm, which is within normal limits.     PANCREAS:  Visualized head and body are unremarkable. Remainder obscured   by overlying bowel gas.    KIDNEY:  Right kidney measures 11 cm in length. No hydronephrosis,   calculi or solid mass.    AORTA/IVC:  Visualized proximal portions unremarkable.    ASCITES:  None.    IMPRESSION:    Unremarkable right upper quadrant ultrasound.        X-ray Chest 2 Views PA/Lat (18 @ 17:39)   Support devices: None.    Cardiac/mediastinum/hilum: Unremarkable.    Lung parenchyma/Pleura: Within normal limits. Hyperinflation.    Skeleton/soft tissues: Levoscoliosis.    Impression:      No radiographic evidence of acute cardiopulmonary disease.      CT Abdomen and Pelvis w/ IV Cont (18 @ 23:09)   LOWER CHEST: Unremarkable.    HEPATOBILIARY: The liver is normal in appearance.  No evidence of intra   or extrahepatic biliary dilatation. The gallbladder is suboptimally   imaged.    SPLEEN: Unremarkable.    PANCREAS: Unremarkable.    ADRENAL GLANDS: Unremarkable.    KIDNEYS: Symmetric pattern of renal enhancement. No evidence of a mass,   hydronephrosis or hydroureter.    ABDOMINOPELVIC NODES: No enlarged abdominal or pelvic lymph nodes.    PELVIC ORGANS: Unremarkable.    PERITONEUM/MESENTERY/BOWEL: No bowel obstruction, ascites or free   intraperitoneal air. The appendix is unremarkable.    BONES/SOFT TISSUES: There is a well-corticated defect involving the   posterior aspect of the superior L4 endplate with small ossicle noted   slightly more posteriorly. 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 Superimposed disc herniation is noted.    VASCULAR:  The aorta is normal in caliber.      IMPRESSION:      No CT evidence of acute intra-abdominal pathology.      Imaging Personally Reviewed:  [x] YES  [ ] NO    MEDICATIONS  (STANDING):  folic acid 1 milliGRAM(s) Oral daily  LORazepam     Tablet   Oral   LORazepam     Tablet 0.5 milliGRAM(s) Oral every 4 hours  multivitamin 1 Tablet(s) Oral daily  pantoprazole    Tablet 40 milliGRAM(s) Oral before breakfast  sodium chloride 0.9%. 1000 milliLiter(s) (125 mL/Hr) IV Continuous <Continuous>  sodium chloride 0.9%. 1000 milliLiter(s) (100 mL/Hr) IV Continuous <Continuous>  thiamine 100 milliGRAM(s) Oral daily    MEDICATIONS  (PRN):  acetaminophen   Tablet. 650 milliGRAM(s) Oral every 6 hours PRN headache  ondansetron Injectable 4 milliGRAM(s) IV Push every 6 hours PRN Nausea and/or Vomiting          HEALTH ISSUES - PROBLEM Dx:  Elevated lactic acid level  Leukocytosis, unspecified type  Alcoholic intoxication with complication

## 2018-09-06 DIAGNOSIS — R74.0 NONSPECIFIC ELEVATION OF LEVELS OF TRANSAMINASE AND LACTIC ACID DEHYDROGENASE [LDH]: ICD-10-CM

## 2018-09-06 DIAGNOSIS — F10.229 ALCOHOL DEPENDENCE WITH INTOXICATION, UNSPECIFIED: ICD-10-CM
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
## 2018-09-06 DIAGNOSIS — F17.210 NICOTINE DEPENDENCE, CIGARETTES, UNCOMPLICATED: ICD-10-CM

## 2018-09-06 DIAGNOSIS — D72.829 ELEVATED WHITE BLOOD CELL COUNT, UNSPECIFIED: ICD-10-CM

## 2018-09-06 DIAGNOSIS — R79.89 OTHER SPECIFIED ABNORMAL FINDINGS OF BLOOD CHEMISTRY: ICD-10-CM

## 2020-12-18 NOTE — PATIENT PROFILE ADULT. - MENTAL HEALTH CONDITIONS/SYMPTOMS, PROFILE
none

## 2022-02-28 NOTE — ED PROVIDER NOTE - QRS
Patient complains of the following symptoms:   Abnormal cologard test they faxed over Friday How long have the symptoms occurred: na    Pharmacy:   na    Patient phone number verified and updated in EPIC: na        [phone]

## 2024-05-21 NOTE — PATIENT PROFILE ADULT. - NS SC CAGE ALCOHOL GUILTY ABOUT
He should keep his appointment with me in June. Does not need to be seen by me sooner than that since he is seeing cardiology. Thanks,  Citlalli Shepard, DO     yes